# Patient Record
Sex: FEMALE | Race: WHITE | NOT HISPANIC OR LATINO | URBAN - METROPOLITAN AREA
[De-identification: names, ages, dates, MRNs, and addresses within clinical notes are randomized per-mention and may not be internally consistent; named-entity substitution may affect disease eponyms.]

---

## 2018-07-11 ENCOUNTER — AMBULATORY SURGICAL CENTER (OUTPATIENT)
Dept: URBAN - METROPOLITAN AREA SURGERY 3 | Facility: SURGERY | Age: 58
End: 2018-07-11
Payer: COMMERCIAL

## 2018-07-11 VITALS
DIASTOLIC BLOOD PRESSURE: 65 MMHG | HEIGHT: 67 IN | HEART RATE: 52 BPM | OXYGEN SATURATION: 96 % | RESPIRATION RATE: 18 BRPM | HEART RATE: 56 BPM | DIASTOLIC BLOOD PRESSURE: 59 MMHG | SYSTOLIC BLOOD PRESSURE: 119 MMHG | WEIGHT: 150 LBS | TEMPERATURE: 97.4 F | SYSTOLIC BLOOD PRESSURE: 106 MMHG | SYSTOLIC BLOOD PRESSURE: 98 MMHG | DIASTOLIC BLOOD PRESSURE: 67 MMHG | HEART RATE: 56 BPM | HEIGHT: 67 IN | DIASTOLIC BLOOD PRESSURE: 59 MMHG | OXYGEN SATURATION: 94 % | DIASTOLIC BLOOD PRESSURE: 62 MMHG | DIASTOLIC BLOOD PRESSURE: 62 MMHG | HEART RATE: 57 BPM | HEART RATE: 52 BPM | DIASTOLIC BLOOD PRESSURE: 65 MMHG | HEART RATE: 54 BPM | WEIGHT: 150 LBS | OXYGEN SATURATION: 98 % | OXYGEN SATURATION: 96 % | TEMPERATURE: 97.6 F | TEMPERATURE: 97.4 F | SYSTOLIC BLOOD PRESSURE: 119 MMHG | SYSTOLIC BLOOD PRESSURE: 98 MMHG | HEART RATE: 55 BPM | HEART RATE: 57 BPM | OXYGEN SATURATION: 94 % | SYSTOLIC BLOOD PRESSURE: 106 MMHG | HEART RATE: 54 BPM | DIASTOLIC BLOOD PRESSURE: 67 MMHG | HEART RATE: 55 BPM | OXYGEN SATURATION: 98 % | RESPIRATION RATE: 18 BRPM | TEMPERATURE: 97.6 F | SYSTOLIC BLOOD PRESSURE: 100 MMHG | RESPIRATION RATE: 17 BRPM | RESPIRATION RATE: 17 BRPM | SYSTOLIC BLOOD PRESSURE: 100 MMHG

## 2018-07-11 DIAGNOSIS — Z86.010 PERSONAL HISTORY OF COLONIC POLYPS: ICD-10-CM

## 2018-07-11 PROCEDURE — G0105 COLORECTAL SCRN; HI RISK IND: HCPCS | Performed by: INTERNAL MEDICINE

## 2023-05-12 ENCOUNTER — OFFICE (OUTPATIENT)
Dept: URBAN - METROPOLITAN AREA CLINIC 11 | Facility: CLINIC | Age: 63
End: 2023-05-12

## 2023-05-12 VITALS
DIASTOLIC BLOOD PRESSURE: 66 MMHG | SYSTOLIC BLOOD PRESSURE: 131 MMHG | OXYGEN SATURATION: 100 % | HEIGHT: 67 IN | WEIGHT: 162.6 LBS | HEART RATE: 53 BPM

## 2023-05-12 DIAGNOSIS — K59.00 CONSTIPATION, UNSPECIFIED: ICD-10-CM

## 2023-05-12 DIAGNOSIS — R10.11 RIGHT UPPER QUADRANT PAIN: ICD-10-CM

## 2023-05-12 DIAGNOSIS — R11.0 NAUSEA: ICD-10-CM

## 2023-05-12 PROCEDURE — 99203 OFFICE O/P NEW LOW 30 MIN: CPT | Performed by: INTERNAL MEDICINE

## 2023-05-12 RX ORDER — FAMOTIDINE 40 MG/1
80 TABLET, FILM COATED ORAL
Qty: 60 | Refills: 5 | Status: ACTIVE

## 2023-05-25 ENCOUNTER — OFFICE (OUTPATIENT)
Dept: URBAN - METROPOLITAN AREA CLINIC 11 | Facility: CLINIC | Age: 63
End: 2023-05-25

## 2023-05-25 VITALS
HEART RATE: 50 BPM | OXYGEN SATURATION: 98 % | WEIGHT: 159 LBS | DIASTOLIC BLOOD PRESSURE: 68 MMHG | SYSTOLIC BLOOD PRESSURE: 133 MMHG | HEIGHT: 66 IN

## 2023-05-25 DIAGNOSIS — K62.5 HEMORRHAGE OF ANUS AND RECTUM: ICD-10-CM

## 2023-05-25 DIAGNOSIS — K59.00 CONSTIPATION, UNSPECIFIED: ICD-10-CM

## 2023-05-25 PROCEDURE — 99214 OFFICE O/P EST MOD 30 MIN: CPT | Performed by: INTERNAL MEDICINE

## 2023-05-25 RX ORDER — DICYCLOMINE HYDROCHLORIDE 10 MG/1
CAPSULE ORAL
Qty: 40 | Refills: 1 | Status: ACTIVE
Start: 2023-05-25

## 2023-05-25 RX ORDER — SODIUM PICOSULFATE, MAGNESIUM OXIDE, AND ANHYDROUS CITRIC ACID 10; 3.5; 12 MG/160ML; G/160ML; G/160ML
LIQUID ORAL
Qty: 320 | Refills: 0 | Status: ACTIVE
Start: 2023-05-25

## 2023-05-25 NOTE — INTERFACERESULTNOTES
Spoke with pt and notified CBC normal pending MD review.  She agreed and understands if further directions needed, we will call and let her know.

## 2023-05-26 LAB
CBC, PLATELET, NO DIFFERENTIAL: HEMATOCRIT: 44.6 % (ref 34–46.6)
CBC, PLATELET, NO DIFFERENTIAL: HEMOGLOBIN: 15 G/DL (ref 11.1–15.9)
CBC, PLATELET, NO DIFFERENTIAL: MCH: 32.3 PG (ref 26.6–33)
CBC, PLATELET, NO DIFFERENTIAL: MCHC: 33.6 G/DL (ref 31.5–35.7)
CBC, PLATELET, NO DIFFERENTIAL: MCV: 96 FL (ref 79–97)
CBC, PLATELET, NO DIFFERENTIAL: NRBC: (no result)
CBC, PLATELET, NO DIFFERENTIAL: PLATELETS: 281 X10E3/UL (ref 150–450)
CBC, PLATELET, NO DIFFERENTIAL: RBC: 4.65 X10E6/UL (ref 3.77–5.28)
CBC, PLATELET, NO DIFFERENTIAL: RDW: 12.3 % (ref 11.7–15.4)
CBC, PLATELET, NO DIFFERENTIAL: WBC: 8.9 X10E3/UL (ref 3.4–10.8)